# Patient Record
(demographics unavailable — no encounter records)

---

## 2025-05-30 NOTE — PHYSICAL EXAM
[Distal Radius] : distal radius [4___] : volarflexion 4[unfilled]/5 [] : palpable radial pulse [Left] : left wrist [Outside films reviewed] : Outside films reviewed [FreeTextEntry8] : buckle fracture of distal radius

## 2025-05-30 NOTE — HISTORY OF PRESENT ILLNESS
[de-identified] : Patient presents for LT wrist pain evaluation. Patient states that she slipped on a boat ramp yesterday and put out her hand to catch herself. Patient presented to [facility] same day and was told they weren't sure if it was fractured. Patient was referred to ortho. Patient was prescribed Percocet but didn't take any today. Patient took Aspirin today.  Patient is RHD.  She has kept splint and sling on.

## 2025-05-30 NOTE — DISCUSSION/SUMMARY
[de-identified] : I reviewed patient's radiographs and discussed her condition and treatment options.  I advised immobilization and provided her with wrist brace.  Follow up in 1 week XR.  Patient voiced understanding and agreement with the plan.

## 2025-06-06 NOTE — DISCUSSION/SUMMARY
[de-identified] : I reviewed patient's radiographs and discussed her condition and treatment options.  I advised more immobilization today. Well-padded short arm fiberglass cast placed today.  I instructed patient to keep cast clean and dry, avoid scratching or putting anything in the cast.  I provided my contact information to call should the cast get wet or patient have any issues with the cast.  Follow up in 2 weeks XRS OOC.  Patient voiced understanding and agreement with the plan.

## 2025-06-06 NOTE — HISTORY OF PRESENT ILLNESS
[de-identified] : Patient is following up on LT wrist fracture. Patient states that she has been icing consistently and states that she wears the brace consistently. Patient states she has a lot of pain while typing.

## 2025-06-06 NOTE — PHYSICAL EXAM
[Distal Radius] : distal radius [4___] : volarflexion 4[unfilled]/5 [Left] : left wrist [] : no ecchymosis [The fracture is in acceptable alignment. There is progression in healing seen] : The fracture is in acceptable alignment. There is progression in healing seen [FreeTextEntry3] : blistering along 1st webspace [FreeTextEntry8] : buckle fracture of distal radius

## 2025-06-20 NOTE — DISCUSSION/SUMMARY
[de-identified] : I reviewed patient's radiographs and discussed her condition and treatment options.  I advised immobilization in removable wrist brace.  Follow up in 3 weeks XRs.  Patient voiced understanding and agreement with the plan.

## 2025-06-20 NOTE — PHYSICAL EXAM
[] : good capillary refill in all fingers [Left] : left wrist [The fracture is in acceptable alignment. There is progression in healing seen] : The fracture is in acceptable alignment. There is progression in healing seen [FreeTextEntry3] : skin intact out of cast

## 2025-06-20 NOTE — HISTORY OF PRESENT ILLNESS
[de-identified] : Patient presents here for follow up for LT wrist fracture. Reports decreased pain and attests to good cast care.

## 2025-07-16 NOTE — PHYSICAL EXAM
[Left] : left wrist [The fracture is in acceptable alignment. There is progression in healing seen] : The fracture is in acceptable alignment. There is progression in healing seen [] : good active flexion and extension of all finger joints [5___] : dorsiflexion 5[unfilled]/5 [FreeTextEntry3] : skin intact out of brace [FreeTextEntry8] : healed fracture in satisfactory alignment

## 2025-07-16 NOTE — DISCUSSION/SUMMARY
[de-identified] : I reviewed patient's radiographs and discussed her condition and treatment options. Discontinue immobilization.  Resume activities as tolerated.  Follow up as needed.  Patient voiced understanding and agreement with the plan.

## 2025-07-16 NOTE — HISTORY OF PRESENT ILLNESS
[de-identified] : Patient presents here for follow up for LT wrist fracture. Patient notes she is swimming 5x a week. Reports swelling and minimal pain. Wearing brace every day.